# Patient Record
Sex: MALE | Race: WHITE | NOT HISPANIC OR LATINO | Employment: FULL TIME | URBAN - METROPOLITAN AREA
[De-identification: names, ages, dates, MRNs, and addresses within clinical notes are randomized per-mention and may not be internally consistent; named-entity substitution may affect disease eponyms.]

---

## 2018-06-28 ENCOUNTER — HISTORICAL (OUTPATIENT)
Dept: LAB | Facility: HOSPITAL | Age: 48
End: 2018-06-28

## 2018-06-28 ENCOUNTER — HISTORICAL (OUTPATIENT)
Dept: RADIOLOGY | Facility: HOSPITAL | Age: 48
End: 2018-06-28

## 2018-09-27 ENCOUNTER — HISTORICAL (OUTPATIENT)
Dept: SURGERY | Facility: HOSPITAL | Age: 48
End: 2018-09-27

## 2019-04-11 ENCOUNTER — HISTORICAL (OUTPATIENT)
Dept: LAB | Facility: HOSPITAL | Age: 49
End: 2019-04-11

## 2019-04-11 LAB
CHOLEST SERPL-MCNC: 253 MG/DL (ref 0–200)
CHOLEST/HDLC SERPL: 7.7 {RATIO} (ref 0–5)
HDLC SERPL-MCNC: 33 MG/DL (ref 40–60)
LDLC SERPL CALC-MCNC: 139 MG/DL (ref 0–129)
TRIGL SERPL-MCNC: 405 MG/DL
VLDLC SERPL CALC-MCNC: 81 MG/DL

## 2019-07-12 ENCOUNTER — HISTORICAL (OUTPATIENT)
Dept: LAB | Facility: HOSPITAL | Age: 49
End: 2019-07-12

## 2019-07-12 LAB
APPEARANCE, UA: CLEAR
BACTERIA SPEC CULT: NORMAL /HPF
BILIRUB UR QL STRIP: NEGATIVE
COLOR UR: YELLOW
GLUCOSE (UA): NEGATIVE
HGB UR QL STRIP: NEGATIVE
KETONES UR QL STRIP: NEGATIVE
LEUKOCYTE ESTERASE UR QL STRIP: NEGATIVE
NITRITE UR QL STRIP: NEGATIVE
PH UR STRIP: 6.5 [PH] (ref 5–9)
PROT UR QL STRIP: NEGATIVE
RBC #/AREA URNS HPF: NORMAL /[HPF]
SP GR UR STRIP: 1.03 (ref 1–1.03)
SQUAMOUS EPITHELIAL, UA: NORMAL
UROBILINOGEN UR STRIP-ACNC: 1
WBC #/AREA URNS HPF: NORMAL /HPF

## 2021-02-12 ENCOUNTER — HISTORICAL (OUTPATIENT)
Dept: LAB | Facility: HOSPITAL | Age: 51
End: 2021-02-12

## 2021-02-12 LAB
CHOLEST SERPL-MCNC: 168 MG/DL
CHOLEST/HDLC SERPL: 4 {RATIO} (ref 0–5)
HDLC SERPL-MCNC: 40 MG/DL (ref 35–60)
LDLC SERPL CALC-MCNC: 60 MG/DL (ref 50–140)
TRIGL SERPL-MCNC: 341 MG/DL (ref 34–140)
VLDLC SERPL CALC-MCNC: 68 MG/DL

## 2022-02-04 ENCOUNTER — HISTORICAL (OUTPATIENT)
Dept: LAB | Facility: HOSPITAL | Age: 52
End: 2022-02-04

## 2022-02-04 LAB
CHOLEST SERPL-MCNC: 156 MG/DL
CHOLEST/HDLC SERPL: 5 {RATIO} (ref 0–5)
HDLC SERPL-MCNC: 31 MG/DL (ref 35–60)
LDLC SERPL CALC-MCNC: 72 MG/DL (ref 50–140)
TRIGL SERPL-MCNC: 264 MG/DL (ref 34–140)
VLDLC SERPL CALC-MCNC: 53 MG/DL

## 2022-03-23 ENCOUNTER — HISTORICAL (OUTPATIENT)
Dept: LAB | Facility: HOSPITAL | Age: 52
End: 2022-03-23

## 2022-03-23 LAB
ABS NEUT (OLG): 4.83 (ref 2.1–9.2)
ALBUMIN SERPL-MCNC: 4.1 G/DL (ref 3.5–5)
ALBUMIN/GLOB SERPL: 1.4 {RATIO} (ref 1.1–2)
ALP SERPL-CCNC: 84 U/L (ref 40–150)
ALT SERPL-CCNC: 35 U/L (ref 0–55)
APPEARANCE, UA: CLEAR
AST SERPL-CCNC: 26 U/L (ref 5–34)
BACTERIA SPEC CULT: NORMAL
BASOPHILS # BLD AUTO: 0 10*3/UL (ref 0–0.2)
BASOPHILS NFR BLD AUTO: 0 %
BILIRUB SERPL-MCNC: 0.7 MG/DL
BILIRUB UR QL STRIP: NEGATIVE
BILIRUBIN DIRECT+TOT PNL SERPL-MCNC: 0.3 (ref 0–0.5)
BILIRUBIN DIRECT+TOT PNL SERPL-MCNC: 0.4 (ref 0–0.8)
BUN SERPL-MCNC: 14.4 MG/DL (ref 8.4–25.7)
CALCIUM SERPL-MCNC: 9.3 MG/DL (ref 8.7–10.5)
CHLORIDE SERPL-SCNC: 105 MMOL/L (ref 98–107)
CHOLEST SERPL-MCNC: 128 MG/DL
CHOLEST/HDLC SERPL: 4 {RATIO} (ref 0–5)
CO2 SERPL-SCNC: 25 MMOL/L (ref 22–29)
COLOR UR: YELLOW
CREAT SERPL-MCNC: 0.91 MG/DL (ref 0.73–1.18)
DEPRECATED CALCIDIOL+CALCIFEROL SERPL-MC: 58.5 NG/ML (ref 30–80)
EOSINOPHIL # BLD AUTO: 0.2 10*3/UL (ref 0–0.9)
EOSINOPHIL NFR BLD AUTO: 2 %
ERYTHROCYTE [DISTWIDTH] IN BLOOD BY AUTOMATED COUNT: 12 % (ref 11.5–17)
EST. AVERAGE GLUCOSE BLD GHB EST-MCNC: 114 MG/DL
GLOBULIN SER-MCNC: 2.9 G/DL (ref 2.4–3.5)
GLUCOSE (UA): NEGATIVE
GLUCOSE SERPL-MCNC: 111 MG/DL (ref 74–100)
HBA1C MFR BLD: 5.6 %
HCT VFR BLD AUTO: 47.7 % (ref 42–52)
HDLC SERPL-MCNC: 30 MG/DL (ref 35–60)
HEMOLYSIS INTERF INDEX SERPL-ACNC: 7
HGB BLD-MCNC: 15.5 G/DL (ref 14–18)
HGB UR QL STRIP: NEGATIVE
ICTERIC INTERF INDEX SERPL-ACNC: 1
IMM GRANULOCYTES # BLD AUTO: 0.01 10*3/UL (ref 0–0.02)
IMM GRANULOCYTES NFR BLD AUTO: 0.1 % (ref 0–0.43)
KETONES UR QL STRIP: NEGATIVE
LDLC SERPL CALC-MCNC: 65 MG/DL (ref 50–140)
LEUKOCYTE ESTERASE UR QL STRIP: NEGATIVE
LIPEMIC INTERF INDEX SERPL-ACNC: 4
LYMPHOCYTES # BLD AUTO: 1.7 10*3/UL (ref 0.6–4.6)
LYMPHOCYTES NFR BLD AUTO: 23 %
MANUAL DIFF? (OHS): NO
MCH RBC QN AUTO: 28.6 PG (ref 27–31)
MCHC RBC AUTO-ENTMCNC: 32.5 G/DL (ref 33–36)
MCV RBC AUTO: 88 FL (ref 80–94)
MONOCYTES # BLD AUTO: 0.6 10*3/UL (ref 0.1–1.3)
MONOCYTES NFR BLD AUTO: 8 %
NEUTROPHILS # BLD AUTO: 4.83 10*3/UL (ref 1.4–7.9)
NEUTROPHILS NFR BLD AUTO: 66 %
NITRITE UR QL STRIP: NEGATIVE
PH UR STRIP: 6.5 [PH] (ref 5–9)
PLATELET # BLD AUTO: 222 10*3/UL (ref 130–400)
PMV BLD AUTO: 9 FL (ref 9.4–12.4)
POTASSIUM SERPL-SCNC: 4.3 MMOL/L (ref 3.5–5.1)
PROT SERPL-MCNC: 7 G/DL (ref 6.4–8.3)
PROT UR QL STRIP: NEGATIVE
PSA SERPL-MCNC: 0.87 NG/ML
RBC # BLD AUTO: 5.42 10*6/UL (ref 4.7–6.1)
RBC #/AREA URNS HPF: NORMAL /[HPF] (ref 0–2)
SODIUM SERPL-SCNC: 140 MMOL/L (ref 136–145)
SP GR UR STRIP: >=1.03 (ref 1–1.03)
SQUAMOUS EPITHELIAL, UA: NORMAL
TRIGL SERPL-MCNC: 165 MG/DL (ref 34–140)
TSH SERPL-ACNC: 2.27 M[IU]/L (ref 0.35–4.94)
UROBILINOGEN UR STRIP-ACNC: 0.2
VIT B12 SERPL-MCNC: 403 PG/ML (ref 213–816)
VLDLC SERPL CALC-MCNC: 33 MG/DL
WBC # SPEC AUTO: 7.4 10*3/UL (ref 4.5–11.5)
WBC #/AREA URNS HPF: NORMAL /[HPF] (ref 0–2)

## 2022-04-09 ENCOUNTER — HISTORICAL (OUTPATIENT)
Dept: ADMINISTRATIVE | Facility: HOSPITAL | Age: 52
End: 2022-04-09

## 2022-04-26 VITALS
OXYGEN SATURATION: 96 % | BODY MASS INDEX: 29.97 KG/M2 | SYSTOLIC BLOOD PRESSURE: 122 MMHG | HEIGHT: 72 IN | DIASTOLIC BLOOD PRESSURE: 86 MMHG | WEIGHT: 221.31 LBS

## 2022-04-30 NOTE — OP NOTE
DATE OF SURGERY:    09/27/2018    SURGEON:  Silas Gonzalez Jr, MD    PREOPERATIVE DIAGNOSIS:  Right distal biceps rupture.    POSTOPERATIVE DIAGNOSIS:  Right distal biceps rupture.    PROCEDURE:  Right distal biceps repair.    ANESTHESIA:  General plus regional.    COMPLICATIONS:  None.    IMPLANTS:  Arthrex.    HISTORY OF PRESENT ILLNESS:  Shane is a very pleasant 48-year-old, who initially injured his distal biceps in July of 2018.  He was seen initially in clinic and I recommended repair.  I discussed with him the risks, benefits and alternatives of the above-stated procedure.    PROCEDURE:  Shane was initially seen in the preoperative unit where his history and physical were reviewed and unchanged.  His right arm was marked.  His consents were reviewed.  All questions were answered.  He was taken to the operating room and placed supine on the operating table where general anesthesia was induced.  His right upper extremity was prepped and draped in a sterile fashion.  Attending led the timeout and confirmed the operative site.  Preoperative antibiotics were administered at the beginning of the procedure.       I started with a transverse incision along the biceps, sharply incised through skin and subcutaneous tissue.  I identified the lateral antebrachial cutaneous nerve and protected it throughout the procedure.  I came down and hit some crossing veins that I ligated with hemoclips, and the recurrent radial artery.  I was then able to palpate his biceps tendon, but I could not free it from the scar.  Therefore, I made an accessory incision about 3 or 4 cm above that, and we were able to identify the biceps tendon and cleaning of the scar.  I then whipstitched the biceps tendon and shuttled it back down into the primary incision.  I came down to the radial tuberosity and identified it under fluoroscopy.  I then placed a pin bicortical and again confirmed this under fluoroscopy.  I overreamed with a size 8  mm reamer which corresponded to the size of the distal biceps tendon.  I then used the button and passed it through the far cortex and was able to shuttle the biceps tendon into the socket using a slide technique.  I then oversewed the tendon itself.  I then further secured it with a 7 mm tenodesis screw.  Happy with this, the biceps nicely abutted into the bone.  I was able to visualize this and also palpate it.  He was tension-free to about 60 degrees of extension.  I irrigated the wound, I let down the tourniquet, obtained hemostasis, and then closed the incision in layers.  Sterile dressing was placed.  He was placed into a splint in about 80 degrees of flexion.  He was awoken from anesthesia and transferred to the postop unit in good condition.        ______________________________  Silas Gonzalez Jr, MD    BEE/UD  DD:  09/27/2018  Time:  12:06PM  DT:  09/27/2018  Time:  12:28PM  Job #:  [id number]

## 2022-06-01 PROBLEM — R07.89 ATYPICAL CHEST PAIN: Status: ACTIVE | Noted: 2022-06-01

## 2022-06-01 PROBLEM — S46.219A RUPTURE OF DISTAL BICEPS TENDON: Status: ACTIVE | Noted: 2022-06-01

## 2022-06-01 PROBLEM — M79.601 PAIN IN RIGHT ARM: Status: ACTIVE | Noted: 2018-07-21

## 2022-06-01 PROBLEM — Z00.00 WELLNESS EXAMINATION: Status: ACTIVE | Noted: 2022-06-01

## 2022-06-01 PROBLEM — E66.9 OBESITY: Status: ACTIVE | Noted: 2022-06-01

## 2022-06-01 PROBLEM — E78.5 HYPERLIPIDEMIA: Status: ACTIVE | Noted: 2022-06-01

## 2022-09-05 PROBLEM — Z00.00 WELLNESS EXAMINATION: Status: RESOLVED | Noted: 2022-06-01 | Resolved: 2022-09-05

## 2022-12-21 ENCOUNTER — HOSPITAL ENCOUNTER (OUTPATIENT)
Dept: RADIOLOGY | Facility: HOSPITAL | Age: 52
Discharge: HOME | End: 2022-12-21
Attending: FAMILY MEDICINE
Payer: COMMERCIAL

## 2022-12-21 DIAGNOSIS — R10.9 FLANK PAIN: ICD-10-CM

## 2022-12-21 DIAGNOSIS — M54.2 NECK PAIN: ICD-10-CM

## 2022-12-21 PROCEDURE — 72040 X-RAY EXAM NECK SPINE 2-3 VW: CPT | Mod: TC

## 2022-12-21 PROCEDURE — 72114 X-RAY EXAM L-S SPINE BENDING: CPT | Mod: TC

## 2023-01-24 ENCOUNTER — HOSPITAL ENCOUNTER (OUTPATIENT)
Dept: RADIOLOGY | Facility: HOSPITAL | Age: 53
Discharge: HOME | End: 2023-01-24
Attending: FAMILY MEDICINE
Payer: COMMERCIAL

## 2023-01-24 DIAGNOSIS — R22.41 MASS OF RIGHT THIGH: ICD-10-CM

## 2023-01-24 PROBLEM — I10 BENIGN HYPERTENSION: Status: ACTIVE | Noted: 2023-01-24

## 2023-01-24 PROCEDURE — 76882 US LMTD JT/FCL EVL NVASC XTR: CPT | Mod: TC,RT

## 2023-02-02 ENCOUNTER — CLINICAL SUPPORT (OUTPATIENT)
Dept: LAB | Facility: HOSPITAL | Age: 53
End: 2023-02-02
Attending: ORTHOPAEDIC SURGERY
Payer: COMMERCIAL

## 2023-02-02 ENCOUNTER — OFFICE VISIT (OUTPATIENT)
Dept: ORTHOPEDICS | Facility: CLINIC | Age: 53
End: 2023-02-02
Payer: COMMERCIAL

## 2023-02-02 ENCOUNTER — HOSPITAL ENCOUNTER (OUTPATIENT)
Dept: RADIOLOGY | Facility: CLINIC | Age: 53
Discharge: HOME | End: 2023-02-02
Attending: ORTHOPAEDIC SURGERY
Payer: COMMERCIAL

## 2023-02-02 VITALS — WEIGHT: 223 LBS | HEIGHT: 71 IN | BODY MASS INDEX: 31.22 KG/M2

## 2023-02-02 DIAGNOSIS — Z01.818 PREOP TESTING: ICD-10-CM

## 2023-02-02 DIAGNOSIS — R22.41 LEG MASS, RIGHT: ICD-10-CM

## 2023-02-02 DIAGNOSIS — M89.8X5 PAIN IN RIGHT FEMUR: ICD-10-CM

## 2023-02-02 DIAGNOSIS — M89.8X5 PAIN IN RIGHT FEMUR: Primary | ICD-10-CM

## 2023-02-02 PROCEDURE — 99204 OFFICE O/P NEW MOD 45 MIN: CPT | Mod: ,,, | Performed by: ORTHOPAEDIC SURGERY

## 2023-02-02 PROCEDURE — 99204 PR OFFICE/OUTPT VISIT, NEW, LEVL IV, 45-59 MIN: ICD-10-PCS | Mod: ,,, | Performed by: ORTHOPAEDIC SURGERY

## 2023-02-02 PROCEDURE — 1160F PR REVIEW ALL MEDS BY PRESCRIBER/CLIN PHARMACIST DOCUMENTED: ICD-10-PCS | Mod: CPTII,,, | Performed by: ORTHOPAEDIC SURGERY

## 2023-02-02 PROCEDURE — 93010 EKG 12-LEAD: ICD-10-PCS | Mod: ,,, | Performed by: INTERNAL MEDICINE

## 2023-02-02 PROCEDURE — 93010 ELECTROCARDIOGRAM REPORT: CPT | Mod: ,,, | Performed by: INTERNAL MEDICINE

## 2023-02-02 PROCEDURE — 1159F MED LIST DOCD IN RCRD: CPT | Mod: CPTII,,, | Performed by: ORTHOPAEDIC SURGERY

## 2023-02-02 PROCEDURE — 4010F ACE/ARB THERAPY RXD/TAKEN: CPT | Mod: CPTII,,, | Performed by: ORTHOPAEDIC SURGERY

## 2023-02-02 PROCEDURE — 3008F BODY MASS INDEX DOCD: CPT | Mod: CPTII,,, | Performed by: ORTHOPAEDIC SURGERY

## 2023-02-02 PROCEDURE — 1160F RVW MEDS BY RX/DR IN RCRD: CPT | Mod: CPTII,,, | Performed by: ORTHOPAEDIC SURGERY

## 2023-02-02 PROCEDURE — 1159F PR MEDICATION LIST DOCUMENTED IN MEDICAL RECORD: ICD-10-PCS | Mod: CPTII,,, | Performed by: ORTHOPAEDIC SURGERY

## 2023-02-02 PROCEDURE — 73552 X-RAY EXAM OF FEMUR 2/>: CPT | Mod: RT,,, | Performed by: ORTHOPAEDIC SURGERY

## 2023-02-02 PROCEDURE — 93005 ELECTROCARDIOGRAM TRACING: CPT

## 2023-02-02 PROCEDURE — 4010F PR ACE/ARB THEARPY RXD/TAKEN: ICD-10-PCS | Mod: CPTII,,, | Performed by: ORTHOPAEDIC SURGERY

## 2023-02-02 PROCEDURE — 3008F PR BODY MASS INDEX (BMI) DOCUMENTED: ICD-10-PCS | Mod: CPTII,,, | Performed by: ORTHOPAEDIC SURGERY

## 2023-02-02 PROCEDURE — 73552 XR FEMUR 2 VIEW RIGHT: ICD-10-PCS | Mod: RT,,, | Performed by: ORTHOPAEDIC SURGERY

## 2023-02-02 RX ORDER — AMOXICILLIN 500 MG
1 CAPSULE ORAL DAILY
COMMUNITY

## 2023-02-02 RX ORDER — SODIUM CHLORIDE, SODIUM GLUCONATE, SODIUM ACETATE, POTASSIUM CHLORIDE AND MAGNESIUM CHLORIDE 30; 37; 368; 526; 502 MG/100ML; MG/100ML; MG/100ML; MG/100ML; MG/100ML
INJECTION, SOLUTION INTRAVENOUS CONTINUOUS
Status: CANCELLED | OUTPATIENT
Start: 2023-02-02

## 2023-02-02 NOTE — PROGRESS NOTES
"Subjective:    CC: Knee Pain (rt leg/posterior. ongoing for about 2 months. states when he does certain movements hamstring will start to bother him. has been having discomfort. reports possible growth around the bump)       HPI:  Patient comes in today for his 1st visit.  Patient complains of swelling, occasional pain about the right inner thigh.  States it started bothering couple months ago, he has been more active lately.  He denies any other masses.  Did have workup with his PCP, ultrasound and MRI.  He is here for evaluation.    ROS: Refer to HPI for pertinent ROS. All other 12 point systems negative.    Objective:  Vitals:    02/02/23 0824   Weight: 101.2 kg (223 lb)   Height: 5' 11" (1.803 m)        Physical Exam:  Well-nourished developed male he is awake alert and oriented x3 skin apparent stress is pleasant and cooperative.  Examination of the right lower extremity compartment soft and warm.  Skin is intact.  There is no signs symptoms of DVT or infection.  Examination of the thigh area he does have some fullness, palpable mass along the posterior inner thigh.  He has a negative Tinel's.  There is no obvious long track signs, there is no other masses appreciated, neurovascular intact distally.    Images:  X-rays two views right femur demonstrate no obvious fracture dislocation, MRI was reviewed today. Images Reviewed and discussed with patient.    Assessment:  1. Leg mass, right  - Ambulatory referral/consult to Orthopedics  - CBC auto differential; Future  - Comprehensive metabolic panel; Future  - EKG 12-lead; Future  - Case Request Operating Room: EXCISION, MASS, LOWER EXTREMITY; thigh    2. Pain in right femur  - X-Ray Femur 2 View Right; Future    3. Preop testing  - CBC auto differential; Future  - Comprehensive metabolic panel; Future  - EKG 12-lead; Future  - Case Request Operating Room: EXCISION, MASS, LOWER EXTREMITY; thigh        Plan:  At this time we discussed his physical exam and x-ray " findings.  We have discussed various treatment options including observation, biopsy versus surgical excision.  We have discussed the pros and cons to this in detail.  All questions were answered.  We have discussed the soft tissue mass abutting enveloping the neurovascular bundle.  We have discussed the risks of surgery, he would like to proceed with surgical intervention.  We have discussed the down time, rehab process.  We will set this up at his convenience.    Follow UP: No follow-ups on file.

## 2023-02-22 ENCOUNTER — ANESTHESIA EVENT (OUTPATIENT)
Dept: SURGERY | Facility: HOSPITAL | Age: 53
End: 2023-02-22
Payer: COMMERCIAL

## 2023-02-23 NOTE — H&P
Admission History & Physical    Subjective:    CC: No chief complaint on file.       HPI:  Shane Ravi presents today for preoperative evaluation for large soft tissue mass excision right leg. I reviewed the indications for surgery. The risks and benefits of the proposed and alternative treatments were discussed with the patient. Questions pertinent to the procedure were solicited and answered. Dr. Gna was available to answer any questions with instruction to call clinic with any further questions.No assurances were given. Informed consent was obtained. The patient expressed good understanding and wished to proceed with scheduling the procedure.     ROS:   Constitutional: No fever, weakness, or fatigue.   Ear/Nose/Mouth/Throat: No nasal congestion or sore throat.   Respiratory: No shortness of breath or cough.   Cardiovascular: No chest pain, palpitations, or peripheral edema.   Gastrointestinal: No nausea, vomiting, or abdominal pain.   Genitourinary: No dysuria.  Musculoskeletal:  Right leg pain swelling    Past Surgical History:   Procedure Laterality Date    BICEPS TENDON REPAIR          Past Medical History:   Diagnosis Date    Essential (primary) hypertension     High cholesterol         Objective:    There were no vitals filed for this visit.     Physical Exam:    Appearance: No distress, good color on room air. Alert and cooperative.  HEENT: Normocephalic. PERRLA EOM intact.   Lungs: Breathing unlabored.  Heart: Regular rate and rhythm.  Abdomen: Soft, non-tender.  No rebound tenderness.  Extremities:  Right lower extremity compartment soft and warm.  Skin is intact.  There is no signs symptoms of DVT or infection.  There is a palpable mass along the posterior aspect of the inner thigh.  There is no other masses appreciated.  He is otherwise neurovascular intact distally.  Skin: No rashes or open wounds.        Assessment:  Large soft tissue mass right inner thigh    Plan:  Plan for soft tissue mass  excision right leg at [facility]. The patient has been given preoperative instructions and prescriptions for post-operative medication. Post-operative appointment is scheduled for 2 weeks.

## 2023-02-24 ENCOUNTER — HOSPITAL ENCOUNTER (OUTPATIENT)
Facility: HOSPITAL | Age: 53
Discharge: HOME | End: 2023-02-24
Attending: ORTHOPAEDIC SURGERY | Admitting: ORTHOPAEDIC SURGERY
Payer: COMMERCIAL

## 2023-02-24 ENCOUNTER — ANESTHESIA (OUTPATIENT)
Dept: SURGERY | Facility: HOSPITAL | Age: 53
End: 2023-02-24
Payer: COMMERCIAL

## 2023-02-24 DIAGNOSIS — M79.89 MASS OF SOFT TISSUE OF THIGH: Primary | ICD-10-CM

## 2023-02-24 DIAGNOSIS — M79.89 SOFT TISSUE MASS: ICD-10-CM

## 2023-02-24 DIAGNOSIS — R22.41 LEG MASS, RIGHT: ICD-10-CM

## 2023-02-24 DIAGNOSIS — Z01.818 PREOP TESTING: ICD-10-CM

## 2023-02-24 PROCEDURE — 27337 EXC THIGH/KNEE LES SC 3 CM/>: CPT | Mod: RT,,, | Performed by: ORTHOPAEDIC SURGERY

## 2023-02-24 PROCEDURE — 25000003 PHARM REV CODE 250: Performed by: ORTHOPAEDIC SURGERY

## 2023-02-24 PROCEDURE — 25000003 PHARM REV CODE 250

## 2023-02-24 PROCEDURE — 88377 M/PHMTRC ALYS ISHQUANT/SEMIQ: CPT | Performed by: ORTHOPAEDIC SURGERY

## 2023-02-24 PROCEDURE — 27337 PR EXCISON TUMOR SOFT TISSUE THIGH/KNEE SUBQ 3+CM: ICD-10-PCS | Mod: RT,,, | Performed by: ORTHOPAEDIC SURGERY

## 2023-02-24 PROCEDURE — 37000009 HC ANESTHESIA EA ADD 15 MINS: Performed by: ORTHOPAEDIC SURGERY

## 2023-02-24 PROCEDURE — 36000707: Performed by: ORTHOPAEDIC SURGERY

## 2023-02-24 PROCEDURE — 25000003 PHARM REV CODE 250: Performed by: STUDENT IN AN ORGANIZED HEALTH CARE EDUCATION/TRAINING PROGRAM

## 2023-02-24 PROCEDURE — 71000015 HC POSTOP RECOV 1ST HR: Performed by: ORTHOPAEDIC SURGERY

## 2023-02-24 PROCEDURE — 37000008 HC ANESTHESIA 1ST 15 MINUTES: Performed by: ORTHOPAEDIC SURGERY

## 2023-02-24 PROCEDURE — 71000033 HC RECOVERY, INTIAL HOUR: Performed by: ORTHOPAEDIC SURGERY

## 2023-02-24 PROCEDURE — 30000890 SPECIMEN TO PATHOLOGY: Mod: 90 | Performed by: ORTHOPAEDIC SURGERY

## 2023-02-24 PROCEDURE — 88305 TISSUE EXAM BY PATHOLOGIST: CPT | Mod: 90 | Performed by: ORTHOPAEDIC SURGERY

## 2023-02-24 PROCEDURE — 36000706: Performed by: ORTHOPAEDIC SURGERY

## 2023-02-24 PROCEDURE — 27337 PR EXCISON TUMOR SOFT TISSUE THIGH/KNEE SUBQ 3+CM: ICD-10-PCS | Mod: AS,RT,,

## 2023-02-24 PROCEDURE — 63600175 PHARM REV CODE 636 W HCPCS: Performed by: STUDENT IN AN ORGANIZED HEALTH CARE EDUCATION/TRAINING PROGRAM

## 2023-02-24 PROCEDURE — 30000890 HC MISC. SEND OUT TEST: Performed by: ORTHOPAEDIC SURGERY

## 2023-02-24 PROCEDURE — 27337 EXC THIGH/KNEE LES SC 3 CM/>: CPT | Mod: AS,RT,,

## 2023-02-24 RX ORDER — ONDANSETRON 2 MG/ML
4 INJECTION INTRAMUSCULAR; INTRAVENOUS EVERY 6 HOURS PRN
Status: DISCONTINUED | OUTPATIENT
Start: 2023-02-24 | End: 2023-02-24 | Stop reason: HOSPADM

## 2023-02-24 RX ORDER — CEFAZOLIN SODIUM 2 G/100ML
2 INJECTION, SOLUTION INTRAVENOUS
Status: DISCONTINUED | OUTPATIENT
Start: 2023-02-24 | End: 2023-03-29

## 2023-02-24 RX ORDER — CEFAZOLIN SODIUM 1 G/3ML
INJECTION, POWDER, FOR SOLUTION INTRAMUSCULAR; INTRAVENOUS
Status: DISCONTINUED | OUTPATIENT
Start: 2023-02-24 | End: 2023-02-24

## 2023-02-24 RX ORDER — ROCURONIUM BROMIDE 10 MG/ML
INJECTION, SOLUTION INTRAVENOUS
Status: DISCONTINUED | OUTPATIENT
Start: 2023-02-24 | End: 2023-02-24

## 2023-02-24 RX ORDER — METHOCARBAMOL 750 MG/1
750 TABLET, FILM COATED ORAL EVERY 6 HOURS PRN
Status: DISCONTINUED | OUTPATIENT
Start: 2023-02-24 | End: 2023-02-24 | Stop reason: HOSPADM

## 2023-02-24 RX ORDER — EPHEDRINE SULFATE 50 MG/ML
INJECTION, SOLUTION INTRAVENOUS
Status: DISCONTINUED | OUTPATIENT
Start: 2023-02-24 | End: 2023-02-24

## 2023-02-24 RX ORDER — PHENYLEPHRINE HCL IN 0.9% NACL 1 MG/10 ML
SYRINGE (ML) INTRAVENOUS
Status: DISCONTINUED | OUTPATIENT
Start: 2023-02-24 | End: 2023-02-24

## 2023-02-24 RX ORDER — MAG HYDROX/ALUMINUM HYD/SIMETH 200-200-20
30 SUSPENSION, ORAL (FINAL DOSE FORM) ORAL EVERY 6 HOURS PRN
Status: DISCONTINUED | OUTPATIENT
Start: 2023-02-24 | End: 2023-02-24 | Stop reason: HOSPADM

## 2023-02-24 RX ORDER — HYDROMORPHONE HYDROCHLORIDE 2 MG/ML
INJECTION, SOLUTION INTRAMUSCULAR; INTRAVENOUS; SUBCUTANEOUS
Status: DISCONTINUED | OUTPATIENT
Start: 2023-02-24 | End: 2023-02-24

## 2023-02-24 RX ORDER — SODIUM CHLORIDE, SODIUM GLUCONATE, SODIUM ACETATE, POTASSIUM CHLORIDE AND MAGNESIUM CHLORIDE 30; 37; 368; 526; 502 MG/100ML; MG/100ML; MG/100ML; MG/100ML; MG/100ML
INJECTION, SOLUTION INTRAVENOUS CONTINUOUS
Status: DISCONTINUED | OUTPATIENT
Start: 2023-02-24 | End: 2023-03-29

## 2023-02-24 RX ORDER — METOCLOPRAMIDE HYDROCHLORIDE 5 MG/ML
10 INJECTION INTRAMUSCULAR; INTRAVENOUS EVERY 6 HOURS PRN
Status: DISCONTINUED | OUTPATIENT
Start: 2023-02-24 | End: 2023-02-24 | Stop reason: HOSPADM

## 2023-02-24 RX ORDER — MORPHINE SULFATE 4 MG/ML
3 INJECTION, SOLUTION INTRAMUSCULAR; INTRAVENOUS
Status: DISCONTINUED | OUTPATIENT
Start: 2023-02-24 | End: 2023-02-24 | Stop reason: HOSPADM

## 2023-02-24 RX ORDER — SODIUM CHLORIDE 9 MG/ML
INJECTION, SOLUTION INTRAVENOUS CONTINUOUS
Status: DISCONTINUED | OUTPATIENT
Start: 2023-02-24 | End: 2023-02-24 | Stop reason: HOSPADM

## 2023-02-24 RX ORDER — HYDROCODONE BITARTRATE AND ACETAMINOPHEN 5; 325 MG/1; MG/1
1 TABLET ORAL EVERY 4 HOURS PRN
Status: DISCONTINUED | OUTPATIENT
Start: 2023-02-24 | End: 2023-02-24 | Stop reason: HOSPADM

## 2023-02-24 RX ORDER — GLYCOPYRROLATE 0.2 MG/ML
INJECTION INTRAMUSCULAR; INTRAVENOUS
Status: DISCONTINUED | OUTPATIENT
Start: 2023-02-24 | End: 2023-02-24

## 2023-02-24 RX ORDER — LIDOCAINE HYDROCHLORIDE 10 MG/ML
INJECTION INFILTRATION; PERINEURAL
Status: DISCONTINUED | OUTPATIENT
Start: 2023-02-24 | End: 2023-02-24 | Stop reason: HOSPADM

## 2023-02-24 RX ORDER — HYDROCODONE BITARTRATE AND ACETAMINOPHEN 5; 325 MG/1; MG/1
1 TABLET ORAL EVERY 6 HOURS PRN
Qty: 16 TABLET | Refills: 0 | Status: SHIPPED | OUTPATIENT
Start: 2023-02-24

## 2023-02-24 RX ORDER — MIDAZOLAM HYDROCHLORIDE 1 MG/ML
INJECTION INTRAMUSCULAR; INTRAVENOUS
Status: DISCONTINUED | OUTPATIENT
Start: 2023-02-24 | End: 2023-02-24

## 2023-02-24 RX ORDER — CALCIUM CARBONATE 200(500)MG
500 TABLET,CHEWABLE ORAL EVERY 8 HOURS PRN
Status: DISCONTINUED | OUTPATIENT
Start: 2023-02-24 | End: 2023-02-24 | Stop reason: HOSPADM

## 2023-02-24 RX ORDER — LIDOCAINE HYDROCHLORIDE 10 MG/ML
INJECTION, SOLUTION EPIDURAL; INFILTRATION; INTRACAUDAL; PERINEURAL
Status: DISCONTINUED | OUTPATIENT
Start: 2023-02-24 | End: 2023-02-24

## 2023-02-24 RX ORDER — ONDANSETRON 2 MG/ML
INJECTION INTRAMUSCULAR; INTRAVENOUS
Status: DISCONTINUED | OUTPATIENT
Start: 2023-02-24 | End: 2023-02-24

## 2023-02-24 RX ORDER — PROPOFOL 10 MG/ML
VIAL (ML) INTRAVENOUS
Status: DISCONTINUED | OUTPATIENT
Start: 2023-02-24 | End: 2023-02-24

## 2023-02-24 RX ORDER — LIDOCAINE HYDROCHLORIDE 10 MG/ML
INJECTION INFILTRATION; PERINEURAL
Status: DISCONTINUED
Start: 2023-02-24 | End: 2023-02-24 | Stop reason: HOSPADM

## 2023-02-24 RX ORDER — FENTANYL CITRATE 50 UG/ML
INJECTION, SOLUTION INTRAMUSCULAR; INTRAVENOUS
Status: DISCONTINUED | OUTPATIENT
Start: 2023-02-24 | End: 2023-02-24

## 2023-02-24 RX ORDER — DEXAMETHASONE SODIUM PHOSPHATE 4 MG/ML
INJECTION, SOLUTION INTRA-ARTICULAR; INTRALESIONAL; INTRAMUSCULAR; INTRAVENOUS; SOFT TISSUE
Status: DISCONTINUED | OUTPATIENT
Start: 2023-02-24 | End: 2023-02-24

## 2023-02-24 RX ADMIN — FENTANYL CITRATE 100 MCG: 50 INJECTION, SOLUTION INTRAMUSCULAR; INTRAVENOUS at 09:02

## 2023-02-24 RX ADMIN — HYDROMORPHONE HYDROCHLORIDE 0.5 MG: 2 INJECTION, SOLUTION INTRAMUSCULAR; INTRAVENOUS; SUBCUTANEOUS at 10:02

## 2023-02-24 RX ADMIN — LIDOCAINE HYDROCHLORIDE 50 MG: 10 INJECTION, SOLUTION EPIDURAL; INFILTRATION; INTRACAUDAL; PERINEURAL at 09:02

## 2023-02-24 RX ADMIN — ROCURONIUM BROMIDE 50 MG: 10 SOLUTION INTRAVENOUS at 09:02

## 2023-02-24 RX ADMIN — CEFAZOLIN 2 G: 330 INJECTION, POWDER, FOR SOLUTION INTRAMUSCULAR; INTRAVENOUS at 09:02

## 2023-02-24 RX ADMIN — NORETHINDRONE AND ETHINYL ESTRADIOL 10 MG: KIT ORAL at 09:02

## 2023-02-24 RX ADMIN — NORETHINDRONE AND ETHINYL ESTRADIOL 10 MG: KIT ORAL at 10:02

## 2023-02-24 RX ADMIN — ONDANSETRON HYDROCHLORIDE 4 MG: 2 SOLUTION INTRAMUSCULAR; INTRAVENOUS at 09:02

## 2023-02-24 RX ADMIN — PROPOFOL 170 MG: 10 INJECTION, EMULSION INTRAVENOUS at 09:02

## 2023-02-24 RX ADMIN — HYDROCODONE BITARTRATE AND ACETAMINOPHEN 1 TABLET: 5; 325 TABLET ORAL at 07:02

## 2023-02-24 RX ADMIN — SODIUM CHLORIDE, SODIUM GLUCONATE, SODIUM ACETATE, POTASSIUM CHLORIDE AND MAGNESIUM CHLORIDE: 526; 502; 368; 37; 30 INJECTION, SOLUTION INTRAVENOUS at 09:02

## 2023-02-24 RX ADMIN — MIDAZOLAM 2 MG: 1 INJECTION INTRAMUSCULAR; INTRAVENOUS at 09:02

## 2023-02-24 RX ADMIN — DEXAMETHASONE SODIUM PHOSPHATE 4 MG: 4 INJECTION, SOLUTION INTRA-ARTICULAR; INTRALESIONAL; INTRAMUSCULAR; INTRAVENOUS; SOFT TISSUE at 09:02

## 2023-02-24 RX ADMIN — Medication 100 MCG: at 09:02

## 2023-02-24 RX ADMIN — SODIUM CHLORIDE, SODIUM GLUCONATE, SODIUM ACETATE, POTASSIUM CHLORIDE AND MAGNESIUM CHLORIDE: 526; 502; 368; 37; 30 INJECTION, SOLUTION INTRAVENOUS at 10:02

## 2023-02-24 RX ADMIN — GLYCOPYRROLATE 0.2 MG: 0.2 INJECTION INTRAMUSCULAR; INTRAVENOUS at 09:02

## 2023-02-24 RX ADMIN — SUGAMMADEX 200 MG: 100 INJECTION, SOLUTION INTRAVENOUS at 10:02

## 2023-02-24 NOTE — BRIEF OP NOTE
[facility] Orthopaedics - Periop Services  Brief Operative Note    Surgery Date: 2/24/2023     Surgeon(s) and Role:     * Estuardo Gan MD - Primary    Assisting Surgeon: None    Pre-op Diagnosis:  Leg mass, right [R22.41]  Preop testing [Z01.818]    Post-op Diagnosis:  Post-Op Diagnosis Codes:     * Leg mass, right [R22.41]     * Preop testing [Z01.818]    Procedure(s) (LRB):  EXCISION, MASS, LOWER EXTREMITY; thigh PRONE (Right)    Anesthesia: General    Operative Findings: soft tissue mass excision right thigh, deep    Estimated Blood Loss: * No values recorded between 2/24/2023  9:49 AM and 2/24/2023 10:35 AM *         Specimens:   Specimen (24h ago, onward)      None              Discharge Note    OUTCOME: Patient tolerated treatment/procedure well without complication and is now ready for discharge.    DISPOSITION: Home or Self Care    FINAL DIAGNOSIS:  Leg mass, right    FOLLOWUP: In clinic    DISCHARGE INSTRUCTIONS:    Discharge Procedure Orders   Diet general   Order Comments: As prior to surgery     Keep surgical extremity elevated     Ice to affected area     No driving, operating heavy equipment or signing legal documents while taking pain medication     Other restrictions (specify):   Order Comments: Weight Bearing:   ROM:     Remove dressing in 72 hours     Wound care routine (specify)   Order Comments: Wound care routine: keep dressing clean, dry, and intact until POD #3. Ok to remove and shower after. No soaking or topical creams.     Call MD for:  temperature >100.4     Call MD for:  persistent nausea and vomiting     Call MD for:  severe uncontrolled pain     Call MD for:  difficulty breathing, headache or visual disturbances     Call MD for:  redness, tenderness, or signs of infection (pain, swelling, redness, odor or green/yellow discharge around incision site)     Call MD for:  hives     Call MD for:  persistent dizziness or light-headedness     Call MD for:  extreme fatigue     Activity  as tolerated     Shower on day dressing removed (No bath)     Weight bearing as tolerated

## 2023-02-24 NOTE — OP NOTE
DATE OF PROCEDURE:   02/24/2023    SURGEON:  Estuardo Gan M.D.    ASSISTANT: JERSON Michele     ASSISTANT ATTESTATION:  PA was essential throughout key and critical portions of the case, including soft tissue retraction, implant placement, and wound closure.    HOSPITAL: [facility]     PREOPERATIVE DIAGNOSIS:  Soft tissue mass right thigh, deep     POSTOPERATIVE DIAGNOSIS:  Same     PROCEDURES PERFORMED:  Soft tissue mass excision right thigh, deep, 10 x 8 cm approximately     ANESTHESIA: general    IV FLUIDS: Per Anesthesia    ESTIMATED BLOOD LOSS:  10cc     COUNTS:  Correct.    COMPLICATIONS:  None.    IMPLANTS: * No implants in log *    CONDITION: Stable to PACU.        INDICATIONS FOR PROCEDURE:    Shane Ravi is a 52 y.o. year old male with continued pain swelling of his right thigh.  Patient had an MRI demonstrating a soft tissue mass, suspected lipoma deep to the thigh, he has been seen in my clinic.  We have discussed various treatment options.  He would like to proceed with surgical excision.  We have discussed it enveloping the neurovascular bundle.  We have the risks, benefits, and alternatives were discussed with the patient in detail. All questions were answered. Informed consent was obtained.       PROCEDURE IN DETAIL: Patient was found in preoperative holding by Anesthesia, confirmed fit for surgery.  The patient was taken to the operating room, placed on the operating table in supine position.  All prominences were well padded.  Time-out was called, identifying the correct patient, correct procedure, correct site.  All were in agreement.  Patient underwent LMA anesthesia without complications.  The patient was then prepped and draped in normal sterile fashion, leaving the right lower extremity exposed to surgery.  Next in the supine position, his leg was frog-legged.  He did have the soft tissue mass appreciated on the medial aspect, mid 3rd of his thigh.  A 10 cm incision was made over the  prominence.  Soft tissue dissection was taken down the fascia.  Next with manual dissection, a large protuberance of fatty tissue which was enveloped was then manually expressed.  The careful and meticulous dissection with use of tenotomies, this was carefully tracked down deeper.  This was taken down to the neurovascular bundle.  The soft tissue mass was then removed.  He had approximately 5% remaining which was interwoven and adhered around the nerve and artery, I decided the risks of complete removal of residual fatty tissue to be fully was too high to be excised given its location and friability.  There was no additional masses appreciated.  The muscle looked appropriate.  There was no active bleeding upon removal of the mass.  Copious irrigation was used to wash the wound the subcutaneous tissue was closed with 2-0 Vicryl skin was closed with 2-0 nylon sutures in standard interrupted fashion.  Xeroform 4x4s soft tissue dressing and an Ace wrap was placed on the right lower extremity, he was then awoken by anesthesia and brought to the PACU in stable condition.  The mass will be sent off to pathology.       ______________________________  Estuardo Gan MD

## 2023-02-24 NOTE — ANESTHESIA PREPROCEDURE EVALUATION
02/24/2023  Shane Ravi is a 52 y.o., male.      Pre-op Assessment    I have reviewed the Patient Summary Reports.     I have reviewed the Nursing Notes. I have reviewed the NPO Status.   I have reviewed the Medications.     Review of Systems  Anesthesia Hx:   Denies Personal Hx of Anesthesia complications.   Cardiovascular:   Hypertension hyperlipidemia    Endocrine:  Obesity / BMI > 30      Physical Exam  General: Well nourished, Cooperative and Alert    Airway:  Mallampati: II   Mouth Opening: Normal  TM Distance: Normal  Tongue: Normal        Anesthesia Plan  Type of Anesthesia, risks & benefits discussed:    Anesthesia Type: Gen ETT  Intra-op Monitoring Plan: Standard ASA Monitors  Post Op Pain Control Plan: multimodal analgesia  Induction:  IV  Informed Consent: Informed consent signed with the Patient and all parties understand the risks and agree with anesthesia plan.  All questions answered.   ASA Score: 2  Day of Surgery Review of History & Physical: H&P Update referred to the surgeon/provider.    Ready For Surgery From Anesthesia Perspective.     .

## 2023-02-24 NOTE — ANESTHESIA POSTPROCEDURE EVALUATION
Anesthesia Post Evaluation    Patient: Shane Ravi    Procedure(s) Performed: Procedure(s) (LRB):  EXCISION, MASS, LOWER EXTREMITY; thigh PRONE (Right)    Final Anesthesia Type: general      Patient location during evaluation: PACU  Patient participation: Yes- Able to Participate  Level of consciousness: awake and alert  Post-procedure vital signs: reviewed and stable  Pain management: adequate  Airway patency: patent    PONV status at discharge: No PONV  Anesthetic complications: no      Respiratory status: unassisted  Hydration status: euvolemic  Follow-up not needed.          Vitals Value Taken Time   /75 02/24/23 1151   Temp 36.3 °C (97.4 °F) 02/24/23 1128   Pulse 79 02/24/23 1154   Resp 20 02/24/23 1145   SpO2 95 % 02/24/23 1154   Vitals shown include unvalidated device data.      Event Time   Out of Recovery 11:23:30         Pain/David Score: Pain Rating Prior to Med Admin: 5 (2/24/2023  7:00 AM)  David Score: 10 (2/24/2023 12:06 PM)  Modified David Score: 20 (2/24/2023 12:06 PM)

## 2023-02-24 NOTE — TRANSFER OF CARE
Anesthesia Transfer of Care Note    Patient: Shane Ravi    Procedure(s) Performed: Procedure(s) (LRB):  EXCISION, MASS, LOWER EXTREMITY; thigh PRONE (Right)    Patient location: PACU    Anesthesia Type: general    Transport from OR: Transported from OR on room air with adequate spontaneous ventilation    Post pain: adequate analgesia    Post assessment: no apparent anesthetic complications    Post vital signs: stable    Level of consciousness: sedated    Nausea/Vomiting: no nausea/vomiting    Complications: none    Transfer of care protocol was followed      Last vitals:   Visit Vitals  /68 (BP Location: Right arm, Patient Position: Lying)   Pulse 67   Temp 36.3 °C (97.3 °F)   Resp 19   Ht 6' (1.829 m)   Wt 102.2 kg (225 lb 5 oz)   SpO2 97%   BMI 30.56 kg/m²

## 2023-02-24 NOTE — ANESTHESIA PROCEDURE NOTES
Intubation    Date/Time: 2/24/2023 9:20 AM  Performed by: Gonsalo Sprague CRNA  Authorized by: Erich Fontenot MD     Intubation:     Induction:  Intravenous    Intubated:  Postinduction    Mask Ventilation:  Easy mask    Attempts:  1    Attempted By:  CRNA    Method of Intubation:  Direct    Blade:  Gaytan 4    Laryngeal View Grade: Grade I - full view of cords      Difficult Airway Encountered?: No      Complications:  None    Airway Device:  Oral endotracheal tube    Airway Device Size:  8.0    Style/Cuff Inflation:  Cuffed (inflated to minimal occlusive pressure)    Tube secured:  22    Secured at:  The lips    Placement Verified By:  Capnometry    Complicating Factors:  None    Findings Post-Intubation:  BS equal bilateral and atraumatic/condition of teeth unchanged

## 2023-02-28 VITALS
WEIGHT: 225.31 LBS | SYSTOLIC BLOOD PRESSURE: 109 MMHG | DIASTOLIC BLOOD PRESSURE: 66 MMHG | RESPIRATION RATE: 20 BRPM | OXYGEN SATURATION: 92 % | HEART RATE: 69 BPM | BODY MASS INDEX: 30.52 KG/M2 | TEMPERATURE: 97 F | HEIGHT: 72 IN

## 2023-03-08 ENCOUNTER — OFFICE VISIT (OUTPATIENT)
Dept: ORTHOPEDICS | Facility: CLINIC | Age: 53
End: 2023-03-08
Payer: COMMERCIAL

## 2023-03-08 VITALS
WEIGHT: 225 LBS | BODY MASS INDEX: 30.48 KG/M2 | HEIGHT: 72 IN | SYSTOLIC BLOOD PRESSURE: 121 MMHG | HEART RATE: 61 BPM | DIASTOLIC BLOOD PRESSURE: 83 MMHG | TEMPERATURE: 98 F

## 2023-03-08 DIAGNOSIS — R22.41 LEG MASS, RIGHT: Primary | ICD-10-CM

## 2023-03-08 PROCEDURE — 3008F BODY MASS INDEX DOCD: CPT | Mod: CPTII,,,

## 2023-03-08 PROCEDURE — 4010F PR ACE/ARB THEARPY RXD/TAKEN: ICD-10-PCS | Mod: CPTII,,,

## 2023-03-08 PROCEDURE — 1159F MED LIST DOCD IN RCRD: CPT | Mod: CPTII,,,

## 2023-03-08 PROCEDURE — 4010F ACE/ARB THERAPY RXD/TAKEN: CPT | Mod: CPTII,,,

## 2023-03-08 PROCEDURE — 99024 PR POST-OP FOLLOW-UP VISIT: ICD-10-PCS | Mod: ,,,

## 2023-03-08 PROCEDURE — 3008F PR BODY MASS INDEX (BMI) DOCUMENTED: ICD-10-PCS | Mod: CPTII,,,

## 2023-03-08 PROCEDURE — 99024 POSTOP FOLLOW-UP VISIT: CPT | Mod: ,,,

## 2023-03-08 PROCEDURE — 3079F DIAST BP 80-89 MM HG: CPT | Mod: CPTII,,,

## 2023-03-08 PROCEDURE — 3074F SYST BP LT 130 MM HG: CPT | Mod: CPTII,,,

## 2023-03-08 PROCEDURE — 1159F PR MEDICATION LIST DOCUMENTED IN MEDICAL RECORD: ICD-10-PCS | Mod: CPTII,,,

## 2023-03-08 PROCEDURE — 1160F RVW MEDS BY RX/DR IN RCRD: CPT | Mod: CPTII,,,

## 2023-03-08 PROCEDURE — 1160F PR REVIEW ALL MEDS BY PRESCRIBER/CLIN PHARMACIST DOCUMENTED: ICD-10-PCS | Mod: CPTII,,,

## 2023-03-08 PROCEDURE — 3074F PR MOST RECENT SYSTOLIC BLOOD PRESSURE < 130 MM HG: ICD-10-PCS | Mod: CPTII,,,

## 2023-03-08 PROCEDURE — 3079F PR MOST RECENT DIASTOLIC BLOOD PRESSURE 80-89 MM HG: ICD-10-PCS | Mod: CPTII,,,

## 2023-03-08 NOTE — PROGRESS NOTES
Subjective:    CC: Post-op Evaluation of the Right Thigh (2/24/23 mass excision . No pain  )       HPI:  Patient presents to clinic for 1st postop appointment status post right posterior medial thigh soft tissue mass excision on 02/24/2023.  Patient is almost 2 weeks out.  He denies any pain today.  Not taking any narcotic pain medication.  He denies any signs of infection.  Ambulating with a normal gait.  He denies any numbness or tingling.    ROS: Refer to HPI for pertinent ROS. All other 12 point systems negative.    Objective:    Vitals:    03/08/23 0906   BP: 121/83   Pulse: 61   Temp: 98.1 °F (36.7 °C)        Physical Exam:  Right lower extremity compartments are soft and warm.  Skin is intact.  There are no signs or symptoms of a DVT or infection.  His incisions well healed.  Stitches removed in clinic today and Steri-Strips applied.  Mild appropriate swelling about the posterior medial thigh, no gross fluctuance or drainage.  Appropriate range of motion at the hip and knee.  Neurovascularly intact distally.    Images:  Previous Images Reviewed and discussed with patient.    Assessment:  1. Leg mass, right       Plan:  Physical exam and intraoperative findings discussed with patient.  Pathology report is still pending.  Overall patient is doing well and is asymptomatic today.  Dr. Gan will call patient with pathology reports once received.  Patient does understand that a small portion of the soft tissue mass was left due to location and risks of removal due to neurovascular bundle. Otherwise patient understands to call with any problems or difficulties.     Follow up: Follow up if symptoms worsen or fail to improve.

## 2023-03-09 DIAGNOSIS — R22.41 LEG MASS, RIGHT: Primary | ICD-10-CM

## 2023-03-10 ENCOUNTER — DOCUMENTATION ONLY (OUTPATIENT)
Dept: HEMATOLOGY/ONCOLOGY | Facility: CLINIC | Age: 53
End: 2023-03-10
Payer: COMMERCIAL

## 2023-03-10 NOTE — NURSING
I spoke to Shane about his appointment on Monday. I explained what to expect. He had a few questions.

## 2023-03-13 ENCOUNTER — OFFICE VISIT (OUTPATIENT)
Dept: HEMATOLOGY/ONCOLOGY | Facility: CLINIC | Age: 53
End: 2023-03-13
Payer: COMMERCIAL

## 2023-03-13 VITALS
OXYGEN SATURATION: 96 % | BODY MASS INDEX: 30.54 KG/M2 | RESPIRATION RATE: 18 BRPM | WEIGHT: 225.5 LBS | HEART RATE: 61 BPM | HEIGHT: 72 IN | TEMPERATURE: 98 F | DIASTOLIC BLOOD PRESSURE: 83 MMHG | SYSTOLIC BLOOD PRESSURE: 132 MMHG

## 2023-03-13 DIAGNOSIS — C49.9 LIPOSARCOMA: Primary | ICD-10-CM

## 2023-03-13 DIAGNOSIS — R22.41 LEG MASS, RIGHT: ICD-10-CM

## 2023-03-13 PROCEDURE — 99999 PR PBB SHADOW E&M-EST. PATIENT-LVL V: CPT | Mod: PBBFAC,,, | Performed by: STUDENT IN AN ORGANIZED HEALTH CARE EDUCATION/TRAINING PROGRAM

## 2023-03-13 PROCEDURE — 1160F RVW MEDS BY RX/DR IN RCRD: CPT | Mod: CPTII,S$GLB,, | Performed by: STUDENT IN AN ORGANIZED HEALTH CARE EDUCATION/TRAINING PROGRAM

## 2023-03-13 PROCEDURE — 99205 PR OFFICE/OUTPT VISIT, NEW, LEVL V, 60-74 MIN: ICD-10-PCS | Mod: S$GLB,,, | Performed by: STUDENT IN AN ORGANIZED HEALTH CARE EDUCATION/TRAINING PROGRAM

## 2023-03-13 PROCEDURE — 3079F PR MOST RECENT DIASTOLIC BLOOD PRESSURE 80-89 MM HG: ICD-10-PCS | Mod: CPTII,S$GLB,, | Performed by: STUDENT IN AN ORGANIZED HEALTH CARE EDUCATION/TRAINING PROGRAM

## 2023-03-13 PROCEDURE — 3075F PR MOST RECENT SYSTOLIC BLOOD PRESS GE 130-139MM HG: ICD-10-PCS | Mod: CPTII,S$GLB,, | Performed by: STUDENT IN AN ORGANIZED HEALTH CARE EDUCATION/TRAINING PROGRAM

## 2023-03-13 PROCEDURE — 99999 PR PBB SHADOW E&M-EST. PATIENT-LVL V: ICD-10-PCS | Mod: PBBFAC,,, | Performed by: STUDENT IN AN ORGANIZED HEALTH CARE EDUCATION/TRAINING PROGRAM

## 2023-03-13 PROCEDURE — 1159F MED LIST DOCD IN RCRD: CPT | Mod: CPTII,S$GLB,, | Performed by: STUDENT IN AN ORGANIZED HEALTH CARE EDUCATION/TRAINING PROGRAM

## 2023-03-13 PROCEDURE — 3008F PR BODY MASS INDEX (BMI) DOCUMENTED: ICD-10-PCS | Mod: CPTII,S$GLB,, | Performed by: STUDENT IN AN ORGANIZED HEALTH CARE EDUCATION/TRAINING PROGRAM

## 2023-03-13 PROCEDURE — 1159F PR MEDICATION LIST DOCUMENTED IN MEDICAL RECORD: ICD-10-PCS | Mod: CPTII,S$GLB,, | Performed by: STUDENT IN AN ORGANIZED HEALTH CARE EDUCATION/TRAINING PROGRAM

## 2023-03-13 PROCEDURE — 4010F PR ACE/ARB THEARPY RXD/TAKEN: ICD-10-PCS | Mod: CPTII,S$GLB,, | Performed by: STUDENT IN AN ORGANIZED HEALTH CARE EDUCATION/TRAINING PROGRAM

## 2023-03-13 PROCEDURE — 1160F PR REVIEW ALL MEDS BY PRESCRIBER/CLIN PHARMACIST DOCUMENTED: ICD-10-PCS | Mod: CPTII,S$GLB,, | Performed by: STUDENT IN AN ORGANIZED HEALTH CARE EDUCATION/TRAINING PROGRAM

## 2023-03-13 PROCEDURE — 4010F ACE/ARB THERAPY RXD/TAKEN: CPT | Mod: CPTII,S$GLB,, | Performed by: STUDENT IN AN ORGANIZED HEALTH CARE EDUCATION/TRAINING PROGRAM

## 2023-03-13 PROCEDURE — 99205 OFFICE O/P NEW HI 60 MIN: CPT | Mod: S$GLB,,, | Performed by: STUDENT IN AN ORGANIZED HEALTH CARE EDUCATION/TRAINING PROGRAM

## 2023-03-13 PROCEDURE — 3075F SYST BP GE 130 - 139MM HG: CPT | Mod: CPTII,S$GLB,, | Performed by: STUDENT IN AN ORGANIZED HEALTH CARE EDUCATION/TRAINING PROGRAM

## 2023-03-13 PROCEDURE — 3079F DIAST BP 80-89 MM HG: CPT | Mod: CPTII,S$GLB,, | Performed by: STUDENT IN AN ORGANIZED HEALTH CARE EDUCATION/TRAINING PROGRAM

## 2023-03-13 PROCEDURE — 3008F BODY MASS INDEX DOCD: CPT | Mod: CPTII,S$GLB,, | Performed by: STUDENT IN AN ORGANIZED HEALTH CARE EDUCATION/TRAINING PROGRAM

## 2023-03-13 NOTE — PROGRESS NOTES
Subjective:       Patient ID: Shane Ravi is a 52 y.o. male.    Chief Complaint:New Patient    Diagnosis: Atypical Lipomatous Tumor/Well-Differentiated liposarcoma    Current Treatment: None    Treatment History: N/A    HPI:   53yo M who presents in March 2023 for evaluation of R thigh well differentiated liposarcoma. He noted increased R thigh occasional pain and mass sensation in Fall/winter of 2022. He had further imaging done in January '23 with PCP which revealed a 9x 14cm R thigh mass, encasing the neurovascular bundle. He underwent partial resection of mass in February '23 where final pathology revealed the mass to be a well differentiated atypical lipomatous tumor.     He has been asymptomatic from a systemic stand point. He has had a colonoscopy within the last several years that revealed 1 small polyp. His maternal grandparents had cancers but otherwise no other family or personal history of malignancy. He currently has no pain. Is approximately 3 weeks out from surgery. He notes small sub-centimeter lumps on his R bicep, posterior neck, and right lateral ankle that he is anxious about.     We had discussion between patient, his wife, and myself over patients diagnosis and my recommendations for therapy moving forward. I will refer him to surgical oncology Dr. Wilcox and radiation oncology Dr. Medellin for further evaluation. All questions were answered at the time of the visit. NCCN guidelines were discussed.       Past Medical History:   Diagnosis Date    Essential (primary) hypertension     High cholesterol       Past Surgical History:   Procedure Laterality Date    BICEPS TENDON REPAIR Right 2018    SURGICAL REMOVAL OF MASS OF LOWER EXTREMITY Right 2/24/2023    Procedure: EXCISION, MASS, LOWER EXTREMITY; thigh PRONE;  Surgeon: Estuardo Gan MD;  Location: [facility];  Service: Orthopedics;  Laterality: Right;     Social History     Socioeconomic History    Marital status:     Number of  children: 3   Occupational History    Occupation: Self employed   Tobacco Use    Smoking status: Never    Smokeless tobacco: Never   Substance and Sexual Activity    Alcohol use: Yes     Comment: occassional    Drug use: Never      Family History   Problem Relation Age of Onset    No Known Problems Mother     No Known Problems Father     No Known Problems Sister     No Known Problems Brother     No Known Problems Maternal Aunt     No Known Problems Maternal Uncle     No Known Problems Paternal Aunt     No Known Problems Paternal Uncle     No Known Problems Maternal Grandmother     No Known Problems Maternal Grandfather     No Known Problems Paternal Grandmother     No Known Problems Paternal Grandfather     No Known Problems Other     Anesthesia problems Neg Hx     Broken bones Neg Hx     Cancer Neg Hx     Clotting disorder Neg Hx     Collagen disease Neg Hx     Diabetes Neg Hx     Dislocations Neg Hx     Osteoporosis Neg Hx     Rheumatologic disease Neg Hx     Scoliosis Neg Hx     Severe sprains Neg Hx       Review of patient's allergies indicates:  No Known Allergies   Review of Systems   Constitutional:  Negative for activity change, appetite change, chills, fatigue and fever.   HENT:  Negative for sore throat.    Eyes:  Negative for visual disturbance.   Respiratory:  Negative for cough and shortness of breath.    Cardiovascular:  Negative for chest pain.   Gastrointestinal:  Negative for abdominal pain, constipation, diarrhea, nausea and vomiting.   Endocrine: Negative for polyuria.   Genitourinary:  Negative for dysuria.   Musculoskeletal:  Negative for back pain.   Integumentary:  Negative for rash.   Allergic/Immunologic: Negative for frequent infections.   Neurological:  Negative for weakness and headaches.   Hematological:  Negative for adenopathy. Does not bruise/bleed easily.       Objective:      Vitals:    03/13/23 1403   BP: 132/83   Pulse: 61   Resp: 18   Temp: 98.1 °F (36.7 °C)       Physical  Exam  Constitutional:       General: He is not in acute distress.     Appearance: Normal appearance. He is not ill-appearing.   HENT:      Head: Normocephalic and atraumatic.      Nose: Nose normal.      Mouth/Throat:      Mouth: Mucous membranes are moist.      Pharynx: Oropharynx is clear.   Eyes:      Extraocular Movements: Extraocular movements intact.      Conjunctiva/sclera: Conjunctivae normal.      Pupils: Pupils are equal, round, and reactive to light.   Cardiovascular:      Rate and Rhythm: Normal rate and regular rhythm.      Pulses: Normal pulses.      Heart sounds: Normal heart sounds. No murmur heard.  Pulmonary:      Effort: Pulmonary effort is normal. No respiratory distress.      Breath sounds: Normal breath sounds.   Abdominal:      General: There is no distension.      Palpations: Abdomen is soft.      Tenderness: There is no abdominal tenderness.   Musculoskeletal:         General: Normal range of motion.      Cervical back: Normal range of motion and neck supple.      Right lower leg: No edema.      Left lower leg: No edema.      Comments: R medial thigh with vertical 5cm incision, minimal erythema. Surrounding area soft.    Lymphadenopathy:      Cervical: No cervical adenopathy.   Skin:     General: Skin is warm and dry.   Neurological:      General: No focal deficit present.      Mental Status: He is alert and oriented to person, place, and time.       LABS AND IMAGING REVIEWED IN [facility]    1/26/23 MRI R Leg  FINDINGS:  There is an elongate fatty mass at the right posteromedial distal thigh, lateral and anterior to the semitendinosis and anterior to the semimembranosus muscle.  This elongate mass is well-marginated and measures 9 x 2.2 x 14.2 cm.  There are hairline thin septations within the lesion with no nodular soft tissue density or other non adipose components.  There is subtle hazy edema in portions of the lesion.  There is linear thin enhancement along the capsule of the lesion and along  the thin septations with no nodular enhancement.  Of note, the lateral aspect of the lesion encases the posterior neurovascular bundle as well demonstrated on the post contrast imaging.  There is no additional mass at the right thigh.  There is no muscle edema or atrophy.  There is no marrow signal abnormality or osseous lesion.    Assessment:   Right Leg Well Differentiated Liposarcoma - Diagnosed February '23. Had incomplete resection prior to pathology known. Well differentiated, MDM2+, no necrosis or increased mitotic activity. Given the size of the tumor and its proximity to the neurovascular bundle I believe he would benefit from neoadjuvant radiation but will defer to surgical oncology. I have discussed this case with Dr. Medellin and she will see patient this week. The role for adjuvant anthracycline + ifosfamide based chemotherapy in a low grade tumor is controversial, may be an option down the line with local recurrence. I have also discussed this case with Dr. Gaffney.       Plan:       - Referral to Dr. Wilcox with surgical oncology  - Referral to Dr. Medellin with radiation oncology  - Will obtain PET for systemic imaging to complete staging  - Obtain Echo from cardiologist   - RTC 3 weeks for MD visit, labs same day    Elizabeth Kennedy LeJeune, MD  Hematology/Oncology   Can[facility] Center [facility]

## 2023-03-15 LAB — VIEW PATHOLOGY REPORT (RELIAPATH): NORMAL

## 2023-03-30 ENCOUNTER — LAB VISIT (OUTPATIENT)
Dept: LAB | Facility: HOSPITAL | Age: 53
End: 2023-03-30
Attending: FAMILY MEDICINE
Payer: COMMERCIAL

## 2023-03-30 DIAGNOSIS — Z00.00 WELLNESS EXAMINATION: ICD-10-CM

## 2023-03-30 LAB
ALBUMIN SERPL-MCNC: 4.4 G/DL (ref 3.5–5)
ALBUMIN/GLOB SERPL: 1.6 RATIO (ref 1.1–2)
ALP SERPL-CCNC: 63 UNIT/L (ref 40–150)
ALT SERPL-CCNC: 31 UNIT/L (ref 0–55)
APPEARANCE UR: CLEAR
AST SERPL-CCNC: 25 UNIT/L (ref 5–34)
BACTERIA #/AREA URNS AUTO: NORMAL /HPF
BASOPHILS # BLD AUTO: 0.04 X10(3)/MCL (ref 0–0.2)
BASOPHILS NFR BLD AUTO: 0.7 %
BILIRUB UR QL STRIP.AUTO: NEGATIVE MG/DL
BILIRUBIN DIRECT+TOT PNL SERPL-MCNC: 0.6 MG/DL
BUN SERPL-MCNC: 19.8 MG/DL (ref 8.4–25.7)
CALCIUM SERPL-MCNC: 9.4 MG/DL (ref 8.4–10.2)
CHLORIDE SERPL-SCNC: 106 MMOL/L (ref 98–107)
CHOLEST SERPL-MCNC: 173 MG/DL
CHOLEST/HDLC SERPL: 5 {RATIO} (ref 0–5)
CO2 SERPL-SCNC: 25 MMOL/L (ref 22–29)
COLOR UR AUTO: YELLOW
CREAT SERPL-MCNC: 0.98 MG/DL (ref 0.73–1.18)
DEPRECATED CALCIDIOL+CALCIFEROL SERPL-MC: 58.2 NG/ML (ref 30–80)
EOSINOPHIL # BLD AUTO: 0.14 X10(3)/MCL (ref 0–0.9)
EOSINOPHIL NFR BLD AUTO: 2.6 %
ERYTHROCYTE [DISTWIDTH] IN BLOOD BY AUTOMATED COUNT: 12.2 % (ref 11.5–17)
EST. AVERAGE GLUCOSE BLD GHB EST-MCNC: 108.3 MG/DL
GFR SERPLBLD CREATININE-BSD FMLA CKD-EPI: >60 MLS/MIN/1.73/M2
GLOBULIN SER-MCNC: 2.8 GM/DL (ref 2.4–3.5)
GLUCOSE SERPL-MCNC: 113 MG/DL (ref 74–100)
GLUCOSE UR QL STRIP.AUTO: NEGATIVE MG/DL
HBA1C MFR BLD: 5.4 %
HCT VFR BLD AUTO: 46.1 % (ref 42–52)
HDLC SERPL-MCNC: 35 MG/DL (ref 35–60)
HGB BLD-MCNC: 15.3 G/DL (ref 14–18)
IMM GRANULOCYTES # BLD AUTO: 0 X10(3)/MCL (ref 0–0.04)
IMM GRANULOCYTES NFR BLD AUTO: 0 %
KETONES UR QL STRIP.AUTO: NEGATIVE MG/DL
LDLC SERPL CALC-MCNC: 67 MG/DL (ref 50–140)
LEUKOCYTE ESTERASE UR QL STRIP.AUTO: NEGATIVE UNIT/L
LYMPHOCYTES # BLD AUTO: 1.98 X10(3)/MCL (ref 0.6–4.6)
LYMPHOCYTES NFR BLD AUTO: 36.1 %
MCH RBC QN AUTO: 29.3 PG (ref 27–31)
MCHC RBC AUTO-ENTMCNC: 33.2 G/DL (ref 33–36)
MCV RBC AUTO: 88.3 FL (ref 80–94)
MONOCYTES # BLD AUTO: 0.45 X10(3)/MCL (ref 0.1–1.3)
MONOCYTES NFR BLD AUTO: 8.2 %
NEUTROPHILS # BLD AUTO: 2.87 X10(3)/MCL (ref 2.1–9.2)
NEUTROPHILS NFR BLD AUTO: 52.4 %
NITRITE UR QL STRIP.AUTO: NEGATIVE
PH UR STRIP.AUTO: 5.5 [PH]
PLATELET # BLD AUTO: 185 X10(3)/MCL (ref 130–400)
PMV BLD AUTO: 9.9 FL (ref 7.4–10.4)
POTASSIUM SERPL-SCNC: 4.4 MMOL/L (ref 3.5–5.1)
PROT SERPL-MCNC: 7.2 GM/DL (ref 6.4–8.3)
PROT UR QL STRIP.AUTO: NEGATIVE MG/DL
PSA SERPL-MCNC: 0.85 NG/ML
RBC # BLD AUTO: 5.22 X10(6)/MCL (ref 4.7–6.1)
RBC #/AREA URNS AUTO: NORMAL /HPF
RBC UR QL AUTO: NEGATIVE UNIT/L
SODIUM SERPL-SCNC: 142 MMOL/L (ref 136–145)
SP GR UR STRIP.AUTO: 1.02
SQUAMOUS #/AREA URNS AUTO: NORMAL /HPF
TRIGL SERPL-MCNC: 354 MG/DL (ref 34–140)
TSH SERPL-ACNC: 2.46 UIU/ML (ref 0.35–4.94)
UROBILINOGEN UR STRIP-ACNC: 0.2 MG/DL
VIT B12 SERPL-MCNC: 330 PG/ML (ref 213–816)
VLDLC SERPL CALC-MCNC: 71 MG/DL
WBC # SPEC AUTO: 5.5 X10(3)/MCL (ref 4.5–11.5)
WBC #/AREA URNS AUTO: NORMAL /HPF

## 2023-03-30 PROCEDURE — 80061 LIPID PANEL: CPT

## 2023-03-30 PROCEDURE — 85025 COMPLETE CBC W/AUTO DIFF WBC: CPT

## 2023-03-30 PROCEDURE — 82607 VITAMIN B-12: CPT

## 2023-03-30 PROCEDURE — 82306 VITAMIN D 25 HYDROXY: CPT

## 2023-03-30 PROCEDURE — 83036 HEMOGLOBIN GLYCOSYLATED A1C: CPT

## 2023-03-30 PROCEDURE — 84153 ASSAY OF PSA TOTAL: CPT

## 2023-03-30 PROCEDURE — 36415 COLL VENOUS BLD VENIPUNCTURE: CPT

## 2023-03-30 PROCEDURE — 80053 COMPREHEN METABOLIC PANEL: CPT

## 2023-03-30 PROCEDURE — 84443 ASSAY THYROID STIM HORMONE: CPT

## 2023-03-30 PROCEDURE — 81001 URINALYSIS AUTO W/SCOPE: CPT

## 2024-04-11 ENCOUNTER — LAB VISIT (OUTPATIENT)
Dept: LAB | Facility: HOSPITAL | Age: 54
End: 2024-04-11
Attending: FAMILY MEDICINE
Payer: COMMERCIAL

## 2024-04-11 DIAGNOSIS — Z00.00 WELLNESS EXAMINATION: ICD-10-CM

## 2024-04-11 LAB
ALBUMIN SERPL-MCNC: 4.3 G/DL (ref 3.5–5)
ALBUMIN/GLOB SERPL: 1.7 RATIO (ref 1.1–2)
ALP SERPL-CCNC: 63 UNIT/L (ref 40–150)
ALT SERPL-CCNC: 25 UNIT/L (ref 0–55)
APPEARANCE UR: CLEAR
AST SERPL-CCNC: 23 UNIT/L (ref 5–34)
BACTERIA #/AREA URNS AUTO: ABNORMAL /HPF
BASOPHILS # BLD AUTO: 0.02 X10(3)/MCL
BASOPHILS NFR BLD AUTO: 0.4 %
BILIRUB SERPL-MCNC: 0.9 MG/DL
BILIRUB UR QL STRIP.AUTO: NEGATIVE
BUN SERPL-MCNC: 13.4 MG/DL (ref 8.4–25.7)
CALCIUM SERPL-MCNC: 9.9 MG/DL (ref 8.4–10.2)
CHLORIDE SERPL-SCNC: 109 MMOL/L (ref 98–107)
CHOLEST SERPL-MCNC: 148 MG/DL
CHOLEST/HDLC SERPL: 4 {RATIO} (ref 0–5)
CO2 SERPL-SCNC: 26 MMOL/L (ref 22–29)
COLOR UR AUTO: YELLOW
CREAT SERPL-MCNC: 0.96 MG/DL (ref 0.73–1.18)
DEPRECATED CALCIDIOL+CALCIFEROL SERPL-MC: 55.7 NG/ML (ref 30–80)
EOSINOPHIL # BLD AUTO: 0.1 X10(3)/MCL (ref 0–0.9)
EOSINOPHIL NFR BLD AUTO: 2 %
ERYTHROCYTE [DISTWIDTH] IN BLOOD BY AUTOMATED COUNT: 12.5 % (ref 11.5–17)
FOLATE SERPL-MCNC: 11.6 NG/ML (ref 7–31.4)
GFR SERPLBLD CREATININE-BSD FMLA CKD-EPI: >60 MLS/MIN/1.73/M2
GLOBULIN SER-MCNC: 2.5 GM/DL (ref 2.4–3.5)
GLUCOSE SERPL-MCNC: 105 MG/DL (ref 74–100)
GLUCOSE UR QL STRIP.AUTO: NEGATIVE
HCT VFR BLD AUTO: 47 % (ref 42–52)
HDLC SERPL-MCNC: 35 MG/DL (ref 35–60)
HGB BLD-MCNC: 15.3 G/DL (ref 14–18)
IMM GRANULOCYTES # BLD AUTO: 0.01 X10(3)/MCL (ref 0–0.04)
IMM GRANULOCYTES NFR BLD AUTO: 0.2 %
KETONES UR QL STRIP.AUTO: NEGATIVE
LDLC SERPL CALC-MCNC: 87 MG/DL (ref 50–140)
LEUKOCYTE ESTERASE UR QL STRIP.AUTO: NEGATIVE
LYMPHOCYTES # BLD AUTO: 1.85 X10(3)/MCL (ref 0.6–4.6)
LYMPHOCYTES NFR BLD AUTO: 36.1 %
MCH RBC QN AUTO: 29.4 PG (ref 27–31)
MCHC RBC AUTO-ENTMCNC: 32.6 G/DL (ref 33–36)
MCV RBC AUTO: 90.4 FL (ref 80–94)
MONOCYTES # BLD AUTO: 0.47 X10(3)/MCL (ref 0.1–1.3)
MONOCYTES NFR BLD AUTO: 9.2 %
MUCOUS THREADS URNS QL MICRO: ABNORMAL /LPF
NEUTROPHILS # BLD AUTO: 2.67 X10(3)/MCL (ref 2.1–9.2)
NEUTROPHILS NFR BLD AUTO: 52.1 %
NITRITE UR QL STRIP.AUTO: NEGATIVE
PH UR STRIP.AUTO: 5.5 [PH]
PLATELET # BLD AUTO: 188 X10(3)/MCL (ref 130–400)
PMV BLD AUTO: 9.6 FL (ref 7.4–10.4)
POTASSIUM SERPL-SCNC: 4.7 MMOL/L (ref 3.5–5.1)
PROT SERPL-MCNC: 6.8 GM/DL (ref 6.4–8.3)
PROT UR QL STRIP.AUTO: NEGATIVE
PSA SERPL-MCNC: 1.35 NG/ML
RBC # BLD AUTO: 5.2 X10(6)/MCL (ref 4.7–6.1)
RBC #/AREA URNS AUTO: ABNORMAL /HPF
RBC UR QL AUTO: NEGATIVE
SODIUM SERPL-SCNC: 142 MMOL/L (ref 136–145)
SP GR UR STRIP.AUTO: >=1.03 (ref 1–1.03)
SQUAMOUS #/AREA URNS AUTO: ABNORMAL /HPF
T3FREE SERPL-MCNC: 2.84 PG/ML (ref 1.58–3.91)
T4 FREE SERPL-MCNC: 1.01 NG/DL (ref 0.7–1.48)
TRIGL SERPL-MCNC: 130 MG/DL (ref 34–140)
TSH SERPL-ACNC: 2.26 UIU/ML (ref 0.35–4.94)
UROBILINOGEN UR STRIP-ACNC: 0.2
VIT B12 SERPL-MCNC: 451 PG/ML (ref 213–816)
VLDLC SERPL CALC-MCNC: 26 MG/DL
WBC # SPEC AUTO: 5.12 X10(3)/MCL (ref 4.5–11.5)
WBC #/AREA URNS AUTO: ABNORMAL /HPF

## 2024-04-11 PROCEDURE — 85025 COMPLETE CBC W/AUTO DIFF WBC: CPT

## 2024-04-11 PROCEDURE — 81001 URINALYSIS AUTO W/SCOPE: CPT

## 2024-04-11 PROCEDURE — 36415 COLL VENOUS BLD VENIPUNCTURE: CPT

## 2024-04-11 PROCEDURE — 84443 ASSAY THYROID STIM HORMONE: CPT

## 2024-04-11 PROCEDURE — 84481 FREE ASSAY (FT-3): CPT

## 2024-04-11 PROCEDURE — 84153 ASSAY OF PSA TOTAL: CPT

## 2024-04-11 PROCEDURE — 84439 ASSAY OF FREE THYROXINE: CPT

## 2024-04-11 PROCEDURE — 82746 ASSAY OF FOLIC ACID SERUM: CPT

## 2024-04-11 PROCEDURE — 82607 VITAMIN B-12: CPT

## 2024-04-11 PROCEDURE — 80053 COMPREHEN METABOLIC PANEL: CPT

## 2024-04-11 PROCEDURE — 82306 VITAMIN D 25 HYDROXY: CPT

## 2024-04-11 PROCEDURE — 80061 LIPID PANEL: CPT

## 2024-09-23 ENCOUNTER — TELEPHONE (OUTPATIENT)
Dept: PHARMACY | Facility: CLINIC | Age: 54
End: 2024-09-23
Payer: COMMERCIAL

## 2024-09-23 NOTE — TELEPHONE ENCOUNTER
[facility]/Population Health Chart Review & Patient Outreach Details For Medication Adherence Project    Reason for Outreach Encounter: 3rd Party payor non-compliance report ([employer], [employer], C, etc)  2.  Patient Outreach Method: Reviewed Patient Chart  3.   Medication in question: olmesartan   LAST FILLED: 7/25/24 for 30 day supply  Hypertension Medications               olmesartan (BENICAR) 20 MG tablet Take 1 tablet (20 mg total) by mouth Daily.              4.  Reviewed and or Updates Made To: Patient Chart  5. Outreach Outcomes and/or actions taken: Patient filled medication and is on track to be adherent

## 2025-04-03 ENCOUNTER — TELEPHONE (OUTPATIENT)
Dept: PHARMACY | Facility: CLINIC | Age: 55
End: 2025-04-03
Payer: COMMERCIAL

## 2025-04-03 NOTE — TELEPHONE ENCOUNTER
[facility]/Population Health Chart Review & Patient Outreach Details For Medication Adherence Project    Reason for Outreach Encounter: 3rd Party payor non-compliance report ([employer], [employer], C, etc)  2.  Patient Outreach Method: Reviewed patient chart   3.   Medication in question:    Hyperlipidemia Medications              atorvastatin (LIPITOR) 20 MG tablet Take 1 tablet (20 mg total) by mouth once daily.    KRILL OIL ORAL Take 1 tablet by mouth every morning.               LF 30 ds 3/18/25    4.  Reviewed and or Updates Made To: Patient Chart  5. Outreach Outcomes and/or actions taken: Patient filled medication and is on track to be adherent  Additional Notes:

## 2025-04-21 ENCOUNTER — LAB VISIT (OUTPATIENT)
Dept: LAB | Facility: HOSPITAL | Age: 55
End: 2025-04-21
Attending: FAMILY MEDICINE
Payer: COMMERCIAL

## 2025-04-21 DIAGNOSIS — Z00.00 WELLNESS EXAMINATION: ICD-10-CM

## 2025-04-21 LAB
25(OH)D3+25(OH)D2 SERPL-MCNC: 63 NG/ML (ref 30–80)
ALBUMIN SERPL-MCNC: 4.5 G/DL (ref 3.5–5)
ALBUMIN/GLOB SERPL: 1.6 RATIO (ref 1.1–2)
ALP SERPL-CCNC: 58 UNIT/L (ref 40–150)
ALT SERPL-CCNC: 20 UNIT/L (ref 0–55)
ANION GAP SERPL CALC-SCNC: 4 MEQ/L
AST SERPL-CCNC: 16 UNIT/L (ref 11–45)
BASOPHILS # BLD AUTO: 0.04 X10(3)/MCL
BASOPHILS NFR BLD AUTO: 0.6 %
BILIRUB SERPL-MCNC: 0.6 MG/DL
BILIRUB UR QL STRIP.AUTO: NEGATIVE
BUN SERPL-MCNC: 20.6 MG/DL (ref 8.4–25.7)
CALCIUM SERPL-MCNC: 9.6 MG/DL (ref 8.4–10.2)
CHLORIDE SERPL-SCNC: 104 MMOL/L (ref 98–107)
CHOLEST SERPL-MCNC: 227 MG/DL
CHOLEST/HDLC SERPL: 4 {RATIO} (ref 0–5)
CLARITY UR: CLEAR
CO2 SERPL-SCNC: 31 MMOL/L (ref 22–29)
COLOR UR AUTO: NORMAL
CREAT SERPL-MCNC: 0.93 MG/DL (ref 0.72–1.25)
CREAT UR-MCNC: 121.1 MG/DL (ref 63–166)
CREAT/UREA NIT SERPL: 22
EOSINOPHIL # BLD AUTO: 0.07 X10(3)/MCL (ref 0–0.9)
EOSINOPHIL NFR BLD AUTO: 1 %
ERYTHROCYTE [DISTWIDTH] IN BLOOD BY AUTOMATED COUNT: 12.2 % (ref 11.5–17)
EST. AVERAGE GLUCOSE BLD GHB EST-MCNC: 111.2 MG/DL
GFR SERPLBLD CREATININE-BSD FMLA CKD-EPI: >60 ML/MIN/1.73/M2
GLOBULIN SER-MCNC: 2.8 GM/DL (ref 2.4–3.5)
GLUCOSE SERPL-MCNC: 102 MG/DL (ref 74–100)
GLUCOSE UR QL STRIP: NEGATIVE
HBA1C MFR BLD: 5.5 %
HCT VFR BLD AUTO: 46.1 % (ref 42–52)
HDLC SERPL-MCNC: 51 MG/DL (ref 35–60)
HGB BLD-MCNC: 15.3 G/DL (ref 14–18)
HGB UR QL STRIP: NEGATIVE
IMM GRANULOCYTES # BLD AUTO: 0.01 X10(3)/MCL (ref 0–0.04)
IMM GRANULOCYTES NFR BLD AUTO: 0.1 %
KETONES UR QL STRIP: NEGATIVE
LDLC SERPL CALC-MCNC: 132 MG/DL (ref 50–140)
LEUKOCYTE ESTERASE UR QL STRIP: NEGATIVE
LYMPHOCYTES # BLD AUTO: 2.38 X10(3)/MCL (ref 0.6–4.6)
LYMPHOCYTES NFR BLD AUTO: 35.5 %
MCH RBC QN AUTO: 29.8 PG (ref 27–31)
MCHC RBC AUTO-ENTMCNC: 33.2 G/DL (ref 33–36)
MCV RBC AUTO: 89.9 FL (ref 80–94)
MICROALBUMIN UR-MCNC: 10.7 UG/ML
MICROALBUMIN/CREAT RATIO PNL UR: 8.8 MG/GM CR (ref 0–30)
MONOCYTES # BLD AUTO: 0.61 X10(3)/MCL (ref 0.1–1.3)
MONOCYTES NFR BLD AUTO: 9.1 %
NEUTROPHILS # BLD AUTO: 3.6 X10(3)/MCL (ref 2.1–9.2)
NEUTROPHILS NFR BLD AUTO: 53.7 %
NITRITE UR QL STRIP: NEGATIVE
PH UR STRIP: 6 [PH]
PLATELET # BLD AUTO: 204 X10(3)/MCL (ref 130–400)
PMV BLD AUTO: 9 FL (ref 7.4–10.4)
POTASSIUM SERPL-SCNC: 4.5 MMOL/L (ref 3.5–5.1)
PROT SERPL-MCNC: 7.3 GM/DL (ref 6.4–8.3)
PROT UR QL STRIP: NEGATIVE
PSA SERPL-MCNC: 1.34 NG/ML
RBC # BLD AUTO: 5.13 X10(6)/MCL (ref 4.7–6.1)
SODIUM SERPL-SCNC: 139 MMOL/L (ref 136–145)
SP GR UR STRIP.AUTO: 1.01 (ref 1–1.03)
T3FREE SERPL-MCNC: 2.48 PG/ML (ref 1.58–3.91)
T4 FREE SERPL-MCNC: 1 NG/DL (ref 0.7–1.48)
TRIGL SERPL-MCNC: 219 MG/DL (ref 34–140)
TSH SERPL-ACNC: 3.73 UIU/ML (ref 0.35–4.94)
UROBILINOGEN UR STRIP-ACNC: 0.2
VIT B12 SERPL-MCNC: 524 PG/ML (ref 213–816)
VLDLC SERPL CALC-MCNC: 44 MG/DL
WBC # BLD AUTO: 6.71 X10(3)/MCL (ref 4.5–11.5)

## 2025-04-21 PROCEDURE — 82607 VITAMIN B-12: CPT

## 2025-04-21 PROCEDURE — 83036 HEMOGLOBIN GLYCOSYLATED A1C: CPT

## 2025-04-21 PROCEDURE — 82746 ASSAY OF FOLIC ACID SERUM: CPT

## 2025-04-21 PROCEDURE — 82043 UR ALBUMIN QUANTITATIVE: CPT

## 2025-04-21 PROCEDURE — 85025 COMPLETE CBC W/AUTO DIFF WBC: CPT

## 2025-04-21 PROCEDURE — 84443 ASSAY THYROID STIM HORMONE: CPT

## 2025-04-21 PROCEDURE — 84481 FREE ASSAY (FT-3): CPT

## 2025-04-21 PROCEDURE — 84439 ASSAY OF FREE THYROXINE: CPT

## 2025-04-21 PROCEDURE — 80053 COMPREHEN METABOLIC PANEL: CPT

## 2025-04-21 PROCEDURE — 81003 URINALYSIS AUTO W/O SCOPE: CPT

## 2025-04-21 PROCEDURE — 82306 VITAMIN D 25 HYDROXY: CPT

## 2025-04-21 PROCEDURE — 84153 ASSAY OF PSA TOTAL: CPT

## 2025-04-21 PROCEDURE — 80061 LIPID PANEL: CPT

## 2025-04-21 PROCEDURE — 36415 COLL VENOUS BLD VENIPUNCTURE: CPT

## 2025-04-22 LAB — FOLATE SERPL-MCNC: 17.8 NG/ML (ref 7–31.4)

## (undated) DEVICE — CUFF ATS 2 PORT SNGL BLDR 34IN

## (undated) DEVICE — GLOVE PROTEXIS HYDROGEL SZ6.5

## (undated) DEVICE — PADDING WYTEX UNDRCST 6INX4YD

## (undated) DEVICE — GOWN POLY REINF X-LONG 2XL

## (undated) DEVICE — ELECTRODE PATIENT RETURN DISP

## (undated) DEVICE — CLOSURE SKIN STERI STRIP 1/2X4

## (undated) DEVICE — DRESSING XEROFORM 1X8IN

## (undated) DEVICE — Device

## (undated) DEVICE — BANDAGE VELCLOSE ELAS 6INX5YD

## (undated) DEVICE — SUT ETHILON 2-0 FS 18IN BLK

## (undated) DEVICE — PAD ABD 8X10 STERILE

## (undated) DEVICE — DRESSING XEROFORM FOIL PK 1X8

## (undated) DEVICE — GAUZE SPONGE 4'X4 12 PLY

## (undated) DEVICE — DRAPE STERI U-SHAPED 47X51IN

## (undated) DEVICE — BANDAGE ACE DOUBLE STER 6IN

## (undated) DEVICE — APPLICATOR CHLORAPREP ORN 26ML

## (undated) DEVICE — GLOVE PROTEXIS BLUE LATEX 8.5

## (undated) DEVICE — PADDING CAST SOFT-ROLL 6 X 4

## (undated) DEVICE — GLOVE PROTEXIS LTX MICRO 8

## (undated) DEVICE — DRAPE T EXTRM SURG 121X128X90

## (undated) DEVICE — GLOVE 6.5 PROTEXIS PI BLUE

## (undated) DEVICE — SUT VICRYL PLUS 2-0 CT1 18

## (undated) DEVICE — GAUZE SPONGE 4X4 12PLY

## (undated) DEVICE — SOL NACL IRR 1000ML BTL